# Patient Record
Sex: FEMALE | Race: BLACK OR AFRICAN AMERICAN | Employment: OTHER | URBAN - METROPOLITAN AREA
[De-identification: names, ages, dates, MRNs, and addresses within clinical notes are randomized per-mention and may not be internally consistent; named-entity substitution may affect disease eponyms.]

---

## 2017-07-18 ENCOUNTER — HOSPITAL ENCOUNTER (OUTPATIENT)
Dept: MAMMOGRAPHY | Age: 62
Discharge: HOME | End: 2017-07-18
Attending: FAMILY MEDICINE
Payer: COMMERCIAL

## 2017-07-18 DIAGNOSIS — Z12.31 VISIT FOR SCREENING MAMMOGRAM: ICD-10-CM

## 2017-07-18 PROCEDURE — 77067 SCR MAMMO BI INCL CAD: CPT

## 2018-02-26 ENCOUNTER — HOSPITAL ENCOUNTER (OUTPATIENT)
Age: 63
Discharge: HOME | End: 2018-02-26
Attending: FAMILY MEDICINE
Payer: COMMERCIAL

## 2018-02-26 DIAGNOSIS — M25.552 PAIN IN LEFT HIP: ICD-10-CM

## 2018-02-26 LAB — CREAT UR-MCNC: 0.8 MG/DL (ref 0.6–1.3)

## 2018-02-26 PROCEDURE — 74011250636 HC RX REV CODE- 250/636: Performed by: FAMILY MEDICINE

## 2018-02-26 PROCEDURE — 82565 ASSAY OF CREATININE: CPT

## 2018-02-26 PROCEDURE — A9585 GADOBUTROL INJECTION: HCPCS | Performed by: FAMILY MEDICINE

## 2018-02-26 PROCEDURE — 73723 MRI JOINT LWR EXTR W/O&W/DYE: CPT

## 2018-02-26 RX ADMIN — GADOBUTROL 7.5 ML: 604.72 INJECTION INTRAVENOUS at 12:00

## 2018-06-25 ENCOUNTER — HOSPITAL ENCOUNTER (OUTPATIENT)
Dept: PHYSICAL THERAPY | Age: 63
Discharge: HOME | End: 2018-06-25
Payer: COMMERCIAL

## 2018-06-25 PROCEDURE — 97110 THERAPEUTIC EXERCISES: CPT

## 2018-06-25 PROCEDURE — 97162 PT EVAL MOD COMPLEX 30 MIN: CPT

## 2018-06-25 NOTE — PROGRESS NOTES
[facility], [address] [place] [address]  [phone] fax  Plan of Care/ Statement of Necessity for Physical Therapy Services     Patient name: Corey Gomez Start of Care: 2018   Referral source: JUDAH Caraballo : 1955    Medical Diagnosis: Low back pain [M54.5]  Lumbar radiculopathy [M54.16]   Onset Date:3 months ago    Treatment Diagnosis: LBP/Left hip pain   Prior Hospitalization: see medical history Provider#: [id number]   Medications: Verified on Patient summary List    Comorbidities: arthritis, HTN, history of stroke   Prior Level of Function: Functionally independent. Lives alone in one story home with steps to enter. The Plan of Care and following information is based on the information from the initial evaluation. Assessment/ key information: Patient is a pleasant 57year old female presenting with Left LE pain progressing over the last three months without a known mechanism of injury. Pain has been primarily in the Left hip with intermittent pain down the Left LE with standing/walking, but the worst pain is in the morning. Patient reports the pain limits her from daily activities until she takes tylenol, and she has begun using a SPC. MRI of hip and xray of back showed pressure on a nerve in her back. At evaluation, demonstrated grossly full lumbar AROM with pain/\"kink\" feeling in Left hip with flexion/extension, but tingling/nerve pain with Left S/B, and Left rotation. Additionally, LE strength was 5/5 bilaterally throughout except Left knee extension was 4+/5. Patient reported tingling with Right hip extension and abduction. Over all patient is a good candidate for therapy due to her high PLOF and will benefit from skilled therapy to address the above deficits.       Evaluation Complexity History MEDIUM  Complexity : 1-2 comorbidities / personal factors will impact the outcome/ POC ; Examination LOW Complexity : 1-2 Standardized tests and measures addressing body structure, function, activity limitation and / or participation in recreation  ;Presentation LOW Complexity : Stable, uncomplicated  ;Clinical Decision Making MEDIUM Complexity : FOTO score of 26-74  Overall Complexity Rating: MEDIUM  Problem List: pain affecting function, decrease ROM, decrease strength, edema affecting function, impaired gait/ balance, decrease ADL/ functional abilitiies, decrease activity tolerance, decrease flexibility/ joint mobility and decrease transfer abilities   Treatment Plan may include any combination of the following: Therapeutic exercise, Therapeutic activities, Neuromuscular re-education, Physical agent/modality, Gait/balance training, Manual therapy, Aquatic therapy, Patient education, Self Care training, Functional mobility training, Home safety training and Stair training and lumbar mechanical traction PRN  Patient / Family readiness to learn indicated by: asking questions, trying to perform skills and interest  Persons(s) to be included in education: patient (P)  Barriers to Learning/Limitations: None  Patient Goal (s): no pain if possible  Patient Self Reported Health Status: good  Rehabilitation Potential: good    Short Term Goals: To be accomplished in 1 weeks:  Goal: Patient will initiate and be compliant with HEP in order to progress toward long term goals  Status at last note/certification: issued and reviewed [facility]  Long Term Goals:  To be accomplished in 10 treatments:  Goal: Patient will improve FOTO assessment score to 67 in order to demonstrate improved functional ability  Status at last note/certification: 54  Goal: Patient will demonstrate grossly full and pain free lumbar ROM in order to improve ease of household tasks  Status at last note/certification: full but painful in flexion, extension, Left S/B, and Left rotation  Goal: Patient will report worst pain in Left hip/LE <6/10 in order to progress toward personal goals  Status at last note/certification: 96/83  Goal: Patient will report a 65% improvement in overall function in order to begin returning to recreational activities. Status at last note/certification: n/a  Frequency / Duration: Patient to be seen  times per week for 10 treatments. Patient/ Caregiver education and instruction: Diagnosis, prognosis, activity modification and exercises   [x]  Plan of care has been reviewed with PTA  Nadine Mustafa, SPT  Johnny Herman, PT 6/25/2018 10:58 AM  _____________________________________________________________________  I certify that the above Therapy Services are being furnished while the patient is under my care. I agree with the treatment plan and certify that this therapy is necessary.     Physician's Signature:____________________  Date:__________Time:______    Please sign and return to [facility] HERO HANEY [address]  [phone] fax

## 2018-06-25 NOTE — PROGRESS NOTES
PT DAILY TREATMENT NOTE     Patient Name: Lauryn Quinones  Date:2018  : 1955  [x]  Patient  Verified  Payor: BLUE CROSS / Plan: [employer] [facility] [place] / Product Type: PPO /    In time:1005  Out time:1055  Total Treatment Time (min): 50  (Total 1:1 Time:50)  Visit #: 1 of 10    Treatment Area: Low back pain [M54.5]  Lumbar radiculopathy [M54.16]    SUBJECTIVE  Pain Level (0-10 scale): 2  Any medication changes, allergies to medications, adverse drug reactions, diagnosis change, or new procedure performed?: [x] No    [] Yes (see summary sheet for update)  Subjective functional status/changes:   [] No changes reported      OBJECTIVE    Modality rationale:    Min Type Additional Details    [] Estim:  []Unatt       []IFC  []Premod                        []Other:  []w/ice   []w/heat  Position:  Location:    [] Estim: []Att    []TENS instruct  []NMES                    []Other:  []w/US   []w/ice   []w/heat  Position:  Location:    []  Traction: [] Cervical       []Lumbar                       [] Prone          []Supine                       []Intermittent   []Continuous Lbs:  [] before manual  [] after manual    []  Ultrasound: []Continuous   [] Pulsed                           []1MHz   []3MHz W/cm2:  Location:    []  Iontophoresis with dexamethasone         Location: [] Take home patch   [] In clinic    []  Ice     []  heat  []  Ice massage  []  Laser   []  Anodyne Position:  Location:    []  Laser with stim  []  Other:  Position:  Location:    []  Vasopneumatic Device Pressure:       [] lo [] med [] hi   Temperature: [] lo [] med [] hi   [] Skin assessment post-treatment:  []intact []redness- no adverse reaction    []redness  adverse reaction:     42 min [x]Eval                  []Re-Eval       8 min Therapeutic Exercise:  [] See flow sheet : HEP   Rationale: increase ROM and increase strength to improve the patients ability to complete ADL.         With   [] TE   [] TA   [] neuro   [] other: Patient Education: [x] Review HEP    [] Progressed/Changed HEP based on:   [] positioning   [] body mechanics   [] transfers   [] heat/ice application    [] other:      Other Objective/Functional Measures:      Pain Level (0-10 scale) post treatment: 2    ASSESSMENT/Changes in Function:     Patient will continue to benefit from skilled PT services to modify and progress therapeutic interventions, address functional mobility deficits, address ROM deficits, address strength deficits, analyze and address soft tissue restrictions, analyze and cue movement patterns, analyze and modify body mechanics/ergonomics, assess and modify postural abnormalities, address imbalance/dizziness and instruct in home and community integration to attain remaining goals. [x]  See Plan of Care  []  See progress note/recertification  []  See Discharge Summary         Progress towards goals / Updated goals:  See POC    PLAN  []  Upgrade activities as tolerated     [x]  Continue plan of care  []  Update interventions per flow sheet       []  Discharge due to:_  []  Other:_      Saud Escobar, PT 6/25/2018  10:54 AM    No future appointments.

## 2018-06-26 ENCOUNTER — HOSPITAL ENCOUNTER (OUTPATIENT)
Dept: PHYSICAL THERAPY | Age: 63
Discharge: HOME | End: 2018-06-26
Payer: COMMERCIAL

## 2018-06-26 PROCEDURE — 97110 THERAPEUTIC EXERCISES: CPT

## 2018-07-02 ENCOUNTER — HOSPITAL ENCOUNTER (OUTPATIENT)
Dept: PHYSICAL THERAPY | Age: 63
Discharge: HOME | End: 2018-07-02
Payer: COMMERCIAL

## 2018-07-02 PROCEDURE — 97110 THERAPEUTIC EXERCISES: CPT

## 2018-07-02 NOTE — PROGRESS NOTES
PT DAILY TREATMENT NOTE     Patient Name: Romi Brochure  Date:2018  : 1955  [x]  Patient  Verified  Payor: BLUE CROSS / Plan:  [facility] [place] / Product Type: PPO /    In time:30  Out time:12;00  Total Treatment Time (min): 30  Visit #: 3 of 10    Treatment Area: Low back pain [M54.5]  Left hip pain [M25.552]    SUBJECTIVE  Pain Level (0-10 scale):  0  Any medication changes, allergies to medications, adverse drug reactions, diagnosis change, or new procedure performed?: [x] No    [] Yes (see summary sheet for update)  Subjective functional status/changes:   [] No changes reported  There hasn't been any pain the last few days    OBJECTIVE    30 min Therapeutic Exercise:  [] See flow sheet :   Rationale: increase ROM and increase strength to improve the patients ability to increase activity toleance       With   [] TE   [] TA   [] neuro   [] other: Patient Education: [x] Review HEP    [] Progressed/Changed HEP based on:   [] positioning   [] body mechanics   [] transfers   [] heat/ice application    [] other:      Other Objective/Functional Measures:      Pain Level (0-10 scale) post treatment:  0    ASSESSMENT/Changes in Function: Pt demo improved core activation and control today while performing stabilization ex's. Patient will continue to benefit from skilled PT services to modify and progress therapeutic interventions, address functional mobility deficits, address ROM deficits, address strength deficits, analyze and address soft tissue restrictions, analyze and cue movement patterns, analyze and modify body mechanics/ergonomics, assess and modify postural abnormalities, address imbalance/dizziness and instruct in home and community integration to attain remaining goals.      []  See Plan of Care  []  See progress note/recertification  []  See Discharge Summary         Progress towards goals / Updated goals:  Goal: Patient will initiate and be compliant with HEP in order to progress toward long term goals  Status at last note/certification: issued and reviewed HEP  Goal Met  Long Term Goals: To be accomplished in 10 treatments:  Goal: Patient will improve FOTO assessment score to 67 in order to demonstrate improved functional ability  Status at last note/certification: 54  Goal: Patient will demonstrate grossly full and pain free lumbar ROM in order to improve ease of household tasks  Status at last note/certification: full but painful in flexion, extension, Left S/B, and Left rotation  Goal: Patient will report worst pain in Left hip/LE <6/10 in order to progress toward personal goals  Status at last note/certification: 26/12  Goal: Patient will report a 65% improvement in overall function in order to begin returning to recreational activities.    Status at last note/certification: n/a       PLAN  [x]  Upgrade activities as tolerated     []  Continue plan of care  []  Update interventions per flow sheet       []  Discharge due to:_  []  Other:_      Marzetta Sever, PTA 7/2/2018  12:24 PM    Future Appointments  Date Time Provider Linda Dykes   7/3/2018 11:00  [address] [facility]   7/9/2018 11:30 AM Sukhi Black [facility]   7/10/2018 10:00  [address] [facility]   7/16/2018 10:00  [address] [facility]   7/17/2018 10:00 AM [facility]   7/23/2018 11:30 AM Sukhi Black [facility]   7/24/2018 9:30 AM [facility]   7/30/2018 9:30 AM [facility]   7/31/2018 9:30 AM Glenny Alcala [facility]

## 2018-07-03 ENCOUNTER — HOSPITAL ENCOUNTER (OUTPATIENT)
Dept: PHYSICAL THERAPY | Age: 63
Discharge: HOME | End: 2018-07-03
Payer: COMMERCIAL

## 2018-07-03 PROCEDURE — 97110 THERAPEUTIC EXERCISES: CPT

## 2018-07-03 NOTE — PROGRESS NOTES
PT DAILY TREATMENT NOTE     Patient Name: Sunny Hawk  Date:7/3/2018  : 1955  [x]  Patient  Verified  Payor: BLUE CROSS / Plan:  [facility] [place] / Product Type: PPO /    In time:11;00  Out time:11;30  Total Treatment Time (min): 30  Visit #: 4 of 10    Treatment Area: Low back pain [M54.5]  Left hip pain [M25.552]    SUBJECTIVE  Pain Level (0-10 scale): 0  Any medication changes, allergies to medications, adverse drug reactions, diagnosis change, or new procedure performed?: [x] No    [] Yes (see summary sheet for update)  Subjective functional status/changes:   [] No changes reported  Things are getting better. The pain is less frequent    OBJECTIVE    30 min Therapeutic Exercise:  [] See flow sheet :   Rationale: increase ROM and increase strength to improve the patients ability to improve ease of daily tasks          With   [] TE   [] TA   [] neuro   [] other: Patient Education: [x] Review HEP    [] Progressed/Changed HEP based on:   [] positioning   [] body mechanics   [] transfers   [] heat/ice application    [] other:      Other Objective/Functional Measures:   Progressed reps     Pain Level (0-10 scale) post treatment:  0    ASSESSMENT/Changes in Function: Symptoms are becoming less  Frequent and allowing pt to perform daily tasks with improved ease    Patient will continue to benefit from skilled PT services to modify and progress therapeutic interventions, address functional mobility deficits, address ROM deficits, address strength deficits, analyze and address soft tissue restrictions, analyze and cue movement patterns, analyze and modify body mechanics/ergonomics, assess and modify postural abnormalities, address imbalance/dizziness and instruct in home and community integration to attain remaining goals.      []  See Plan of Care  []  See progress note/recertification  []  See Discharge Summary         Progress towards goals / Updated goals:  Goal: Patient will initiate and be compliant with HEP in order to progress toward long term goals  Status at last note/certification: issued and reviewed HEP  Goal Met  Long Term Goals: To be accomplished in 10 treatments:  Goal: Patient will improve FOTO assessment score to 67 in order to demonstrate improved functional ability  Status at last note/certification: 54   ASSESS NEXT SESSION  Goal: Patient will demonstrate grossly full and pain free lumbar ROM in order to improve ease of household tasks  Status at last note/certification: full but painful in flexion, extension, Left S/B, and Left rotation  Goal: Patient will report worst pain in Left hip/LE <6/10 in order to progress toward personal goals  Status at last note/certification: 80/78  Goal: Patient will report a 65% improvement in overall function in order to begin returning to recreational activities.    Status at last note/certification: n/a  Unable to rate, but symptoms are less frequent    PLAN  [x]  Upgrade activities as tolerated     []  Continue plan of care  []  Update interventions per flow sheet       []  Discharge due to:_  []  Other:_      Mulu Aguero, LYLE 7/3/2018  11:37 AM    Future Appointments  Date Time Provider Linda Dykes   7/9/2018 11:30 AM Keyon Osman PT [facility]   7/10/2018 10:00  [address] [facility]   7/16/2018 10:00  [address] [facility]   7/17/2018 10:00  [address] [facility]   7/23/2018 11:30 AM Keyon Osman PT [facility]   7/24/2018 9:30 AM Shari Carrion [facility]   7/30/2018 9:30 AM Shari Carrion [facility]   7/31/2018 9:30  [address] [facility]

## 2018-07-09 ENCOUNTER — HOSPITAL ENCOUNTER (OUTPATIENT)
Dept: PHYSICAL THERAPY | Age: 63
Discharge: HOME | End: 2018-07-09
Payer: COMMERCIAL

## 2018-07-09 PROCEDURE — 97112 NEUROMUSCULAR REEDUCATION: CPT

## 2018-07-09 PROCEDURE — 97110 THERAPEUTIC EXERCISES: CPT

## 2018-07-09 NOTE — PROGRESS NOTES
PT DAILY TREATMENT NOTE     Patient Name: Marlon Parsons  Date:2018  : 1955  [x]  Patient  Verified  Payor: BLUE CROSS / Plan: [employer] [facility] / Product Type: PPO /    In time:1130  Out time:1212  Total Treatment Time (min): 42  (Total 1:1 Time: 30)  Visit #: 5 of 10    Treatment Area: Low back pain [M54.5]  Left hip pain [M25.552]    SUBJECTIVE  Pain Level (0-10 scale): 0  Any medication changes, allergies to medications, adverse drug reactions, diagnosis change, or new procedure performed?: [x] No    [] Yes (see summary sheet for update)  Subjective functional status/changes:   [] No changes reported  I'm not using my cane! I got a cortisone shot last Thursday and have been feeling really good.      OBJECTIVE    Modality rationale:    Min Type Additional Details    [] Estim:  []Unatt       []IFC  []Premod                        []Other:  []w/ice   []w/heat  Position:  Location:    [] Estim: []Att    []TENS instruct  []NMES                    []Other:  []w/US   []w/ice   []w/heat  Position:  Location:    []  Traction: [] Cervical       []Lumbar                       [] Prone          []Supine                       []Intermittent   []Continuous Lbs:  [] before manual  [] after manual    []  Ultrasound: []Continuous   [] Pulsed                           []1MHz   []3MHz W/cm2:  Location:    []  Iontophoresis with dexamethasone         Location: [] Take home patch   [] In clinic    []  Ice     []  heat  []  Ice massage  []  Laser   []  Anodyne Position:  Location:    []  Laser with stim  []  Other:  Position:  Location:    []  Vasopneumatic Device Pressure:       [] lo [] med [] hi   Temperature: [] lo [] med [] hi   [] Skin assessment post-treatment:  []intact []redness- no adverse reaction    []redness  adverse reaction:     34 min Therapeutic Exercise:  [x] See flow sheet :   Rationale: increase ROM and increase strength to improve the patients ability to improve ease of daily tasks    8 min Neuromuscular Re-education:  [x]  See flow sheet :   Rationale: improve coordination, improve balance and increase proprioception  to improve the patients ability to improve core/gluteal activation          With   [] TE   [] TA   [] neuro   [] other: Patient Education: [x] Review HEP    [] Progressed/Changed HEP based on:   [] positioning   [] body mechanics   [] transfers   [] heat/ice application    [] other:      Other Objective/Functional Measures: Added reformer LE series     Pain Level (0-10 scale) post treatment: 0    ASSESSMENT/Changes in Function: Patient reported no pain with increased resistance and the addition of reformer exercise. Reports that she is no longer using cane to ambulate, and says she feels steady but is slower and more conscious of posture when walking. Patient will continue to benefit from skilled PT services to modify and progress therapeutic interventions, address functional mobility deficits, address ROM deficits, address strength deficits, analyze and address soft tissue restrictions, analyze and cue movement patterns, analyze and modify body mechanics/ergonomics, assess and modify postural abnormalities, address imbalance/dizziness and instruct in home and community integration to attain remaining goals.      []  See Plan of Care  []  See progress note/recertification  []  See Discharge Summary         Progress towards goals / Updated goals:  Goal: Patient will initiate and be compliant with HEP in order to progress toward long term goals  Status at last note/certification: issued and reviewed HEP  Current status: Goal Met  Long Term Goals: To be accomplished in 10 treatments:  Goal: Patient will improve FOTO assessment score to 67 in order to demonstrate improved functional ability  Status at last note/certification: 54   Current status: Progressing-57  Goal: Patient will demonstrate grossly full and pain free lumbar ROM in order to improve ease of household tasks  Status at last note/certification: full but painful in flexion, extension, Left S/B, and Left rotation  Current status: Assess next visit  Goal: Patient will report worst pain in Left hip/LE <6/10 in order to progress toward personal goals  Status at last note/certification: 21/13  Current status: Assess next visit  Goal: Patient will report a 65% improvement in overall function in order to begin returning to recreational activities.    Status at last note/certification: n/a  Current status: Unable to rate, but symptoms are less frequent    PLAN  [x]  Upgrade activities as tolerated     [x]  Continue plan of care  []  Update interventions per flow sheet       []  Discharge due to:_  []  Other:_      Jeffery Dent, SPT  Keyon Osman PT 7/9/2018  11:30 AM    Future Appointments  Date Time Provider Linda Dykes   7/10/2018 10:00  [address] [facility]   7/16/2018 10:00  [address] [facility]   7/17/2018 10:00  [address] [facility]   7/23/2018 11:30 AM Keyon Osman PT [facility]   7/24/2018 9:30 AM Domi Lance [facility]   7/30/2018 9:30  [address] [facility]   7/31/2018 9:30  [address] [facility]

## 2018-07-10 ENCOUNTER — HOSPITAL ENCOUNTER (OUTPATIENT)
Dept: PHYSICAL THERAPY | Age: 63
Discharge: HOME | End: 2018-07-10
Payer: COMMERCIAL

## 2018-07-10 PROCEDURE — 97110 THERAPEUTIC EXERCISES: CPT

## 2018-07-10 PROCEDURE — 97112 NEUROMUSCULAR REEDUCATION: CPT

## 2018-07-10 NOTE — PROGRESS NOTES
PT DAILY TREATMENT NOTE     Patient Name: Norberto Cheadle  Date:7/10/2018  : 1955  [x]  Patient  Verified  Payor: BLUE CROSS / Plan: [employer] [facility] / Product Type: PPO /    In time:9;55  Out time:10;25  Total Treatment Time (min): 30  Visit #: 6 of 10    Treatment Area: Low back pain [M54.5]  Left hip pain [M25.552]    SUBJECTIVE  Pain Level (0-10 scale): 0  Any medication changes, allergies to medications, adverse drug reactions, diagnosis change, or new procedure performed?: [x] No    [] Yes (see summary sheet for update)  Subjective functional status/changes:   [] No changes reported  Just a little pain this morning, but I worked it put.  Now I feel gret    OBJECTIVE    22 min Therapeutic Exercise:  [] See flow sheet :   Rationale: increase ROM and increase strength to improve the patients ability to improve ease of daily tasks     8 min Neuromuscular Re-education:  []  See flow sheet :   Rationale: increase strength and improve coordination  to improve the patients ability to increase core stability for increased activity tolerance          With   [] TE   [] TA   [] neuro   [] other: Patient Education: [x] Review HEP    [] Progressed/Changed HEP based on:   [] positioning   [] body mechanics   [] transfers   [] heat/ice application    [] other:      Other Objective/Functional Measures:      Pain Level (0-10 scale) post treatment:  0    ASSESSMENT/Changes in Function: Progressing well with significant reduction in pain and ease of mobility    Patient will continue to benefit from skilled PT services to modify and progress therapeutic interventions, address functional mobility deficits, address ROM deficits, address strength deficits, analyze and address soft tissue restrictions, analyze and cue movement patterns, analyze and modify body mechanics/ergonomics, assess and modify postural abnormalities, address imbalance/dizziness and instruct in home and community integration to attain remaining goals. []  See Plan of Care  []  See progress note/recertification  []  See Discharge Summary         Progress towards goals / Updated goals:  Goal: Patient will initiate and be compliant with HEP in order to progress toward long term goals  Status at last note/certification: issued and reviewed HEP  Current status: Goal Met  Long Term Goals: To be accomplished in 10 treatments:  Goal: Patient will improve FOTO assessment score to 67 in order to demonstrate improved functional ability  Status at last note/certification: 54   Current status: Progressing-57  Goal: Patient will demonstrate grossly full and pain free lumbar ROM in order to improve ease of household tasks  Status at last note/certification: full but painful in flexion, extension, Left S/B, and Left rotation  Current status: WNL very slight discomfort only. Goal: Patient will report worst pain in Left hip/LE <6/10 in order to progress toward personal goals  Status at last note/certification: 58/34  Current status: met  3/10  Goal: Patient will report a 65% improvement in overall function in order to begin returning to recreational activities.    Status at last note/certification: n/a  Current status: Unable to rate, but symptoms are less frequent    PLAN  [x]  Upgrade activities as tolerated     []  Continue plan of care  []  Update interventions per flow sheet       []  Discharge due to:_  []  Other:_      Jarett Kendrick, LYLE 7/10/2018  9:54 AM    Future Appointments  Date Time Provider Linda Dykes   7/10/2018 10:00  [address] [facility]   7/16/2018 10:00  [address] [facility]   7/17/2018 10:00  [address] [facility]   7/23/2018 11:30 AM Zehra Villalobos PT [facility]   7/24/2018 9:30 AM Rashard You [facility]   7/30/2018 9:30  [address] [facility]   7/31/2018 9:30  [address] [facility]

## 2018-07-16 ENCOUNTER — HOSPITAL ENCOUNTER (OUTPATIENT)
Dept: PHYSICAL THERAPY | Age: 63
Discharge: HOME | End: 2018-07-16
Payer: COMMERCIAL

## 2018-07-16 PROCEDURE — 97110 THERAPEUTIC EXERCISES: CPT

## 2018-07-16 PROCEDURE — 97112 NEUROMUSCULAR REEDUCATION: CPT

## 2018-07-16 NOTE — PROGRESS NOTES
PT DAILY TREATMENT NOTE - [facility]     Patient Name: Ulysses Son  Date:2018  : 1955  [x]  Patient  Verified  Payor: BLUE CROSS / Plan: [employer] [facility] / Product Type: PPO /    In time:10;00  Out time:10;35  Total Treatment Time (min): 35  (1:1 time)  25 minutes  Visit #: 7 of 10    Treatment Area: Low back pain [M54.5]  Left hip pain [M25.552]    SUBJECTIVE  Pain Level (0-10 scale): 0  Any medication changes, allergies to medications, adverse drug reactions, diagnosis change, or new procedure performed?: [x] No    [] Yes (see summary sheet for update)  Subjective functional status/changes:   [] No changes reported  I had an episode of sharp hip pain this morning when I stood up. It was brief, but then I stepped and it started again. I had to sit down and it resolved. OBJECTIVE    25 min Therapeutic Exercise:  [] See flow sheet :   Rationale: increase ROM and increase strength to improve the patients ability to improve ease of mobility, transfers     10 min Neuromuscular Re-education:  []  See flow sheet :   Rationale: increase strength and improve coordination  to improve the patients ability to engage core for lumbar support,ease of walking       With   [] TE   [] TA   [] neuro   [] other: Patient Education: [x] Review HEP    [] Progressed/Changed HEP based on:   [] positioning   [] body mechanics   [] transfers   [] heat/ice application    [] other:      Other Objective/Functional Measures:      Pain Level (0-10 scale) post treatment:  0    ASSESSMENT/Changes in Function: progressing well.   Still has occasional episode of sharp hip pains but brief in duration    Patient will continue to benefit from skilled PT services to modify and progress therapeutic interventions, address functional mobility deficits, address ROM deficits, address strength deficits, analyze and address soft tissue restrictions, analyze and cue movement patterns, analyze and modify body mechanics/ergonomics, assess and modify postural abnormalities, address imbalance/dizziness and instruct in home and community integration to attain remaining goals. []  See Plan of Care  []  See progress note/recertification  []  See Discharge Summary         Progress towards goals / Updated goals:  Goal: Patient will initiate and be compliant with HEP in order to progress toward long term goals  Status at last note/certification: issued and reviewed HEP  Current status: Goal Met  Long Term Goals: To be accomplished in 10 treatments:  Goal: Patient will improve FOTO assessment score to 67 in order to demonstrate improved functional ability  Status at last note/certification: 54   Current status: Progressing-57  Goal: Patient will demonstrate grossly full and pain free lumbar ROM in order to improve ease of household tasks  Status at last note/certification: full but painful in flexion, extension, Left S/B, and Left rotation  Current status: WNL very slight discomfort only. Goal: Patient will report worst pain in Left hip/LE <6/10 in order to progress toward personal goals  Status at last note/certification: 61/33  Current status: met  3/10  Goal: Patient will report a 65% improvement in overall function in order to begin returning to recreational activities.    Status at last note/certification: n/a  Current status: Unable to rate, but symptoms are less frequent    PLAN  [x]  Upgrade activities as tolerated     []  Continue plan of care  []  Update interventions per flow sheet       []  Discharge due to:_  []  Other:_      Checo Mistry PTA 7/16/2018  10:17 AM    Future Appointments  Date Time Provider Linda Dykes   7/17/2018 3:30 PM Bishop Olivo PTA [facility]   7/23/2018 11:30 AM Jayce Talamantes PT [facility]   7/24/2018 9:30 AM Maribel Cabral [facility]   7/30/2018 9:30  [address] [facility]   7/31/2018 9:30  [address] [facility]

## 2018-07-17 ENCOUNTER — HOSPITAL ENCOUNTER (OUTPATIENT)
Dept: PHYSICAL THERAPY | Age: 63
Discharge: HOME | End: 2018-07-17
Payer: COMMERCIAL

## 2018-07-17 PROCEDURE — 97110 THERAPEUTIC EXERCISES: CPT

## 2018-07-17 PROCEDURE — 97112 NEUROMUSCULAR REEDUCATION: CPT

## 2018-07-17 NOTE — PROGRESS NOTES
PT DAILY TREATMENT NOTE     Patient Name: Damon Chou  Date:2018  : 1955  [x]  Patient  Verified  Payor: BLUE CROSS / Plan:  [facility] / Product Type: PPO /    In time:3:30  Out time:4:15  Total Treatment Time (min): 45  1;1: 35 mins  Visit #: 8 of 10    Treatment Area: Low back pain [M54.5]  Left hip pain [M25.552]    SUBJECTIVE  Pain Level (0-10 scale): 0/10  Any medication changes, allergies to medications, adverse drug reactions, diagnosis change, or new procedure performed?: [x] No    [] Yes (see summary sheet for update)  Subjective functional status/changes:   [] No changes reported  \"No p! \"    OBJECTIVE    30 min Therapeutic Exercise:  [] See flow sheet :   Rationale: increase ROM and increase strength to improve the patients ability to perform ADLs with less difficulty. 15 min Neuromuscular Re-education:  []  See flow sheet :pilates, core stab. Ex. Rationale: increase ROM and increase strength  to improve the patients ability to perform ADLs with less difficulty. With   [] TE   [] TA   [] neuro   [] other: Patient Education: [x] Review HEP    [] Progressed/Changed HEP based on:   [] positioning   [] body mechanics   [] transfers   [] heat/ice application    [] other:      Other Objective/Functional Measures:      Pain Level (0-10 scale) post treatment: 0/10    ASSESSMENT/Changes in Function: Continued with ex. Per flow sheet. No p! Was reported during or after therapy. Patient will continue to benefit from skilled PT services to modify and progress therapeutic interventions, address functional mobility deficits, address ROM deficits, address strength deficits, analyze and address soft tissue restrictions and analyze and cue movement patterns to attain remaining goals.      []  See Plan of Care  []  See progress note/recertification  []  See Discharge Summary         Progress towards goals / Updated goals:  Goal: Patient will initiate and be compliant with HEP in order to progress toward long term goals  Status at last note/certification: issued and reviewed HEP  Current status: Goal Met  Long Term Goals: To be accomplished in 10 treatments:  Goal: Patient will improve FOTO assessment score to 67 in order to demonstrate improved functional ability  Status at last note/certification: 54   Current status: Progressing-57  Goal: Patient will demonstrate grossly full and pain free lumbar ROM in order to improve ease of household tasks  Status at last note/certification: full but painful in flexion, extension, Left S/B, and Left rotation  Current status: WNL very slight discomfort only. Goal: Patient will report worst pain in Left hip/LE <6/10 in order to progress toward personal goals  Status at last note/certification: 27/00  Current status: met  3/10  Goal: Patient will report a 65% improvement in overall function in order to begin returning to recreational activities.    Status at last note/certification: n/a  Current status: Unable to rate, but symptoms are less frequent       PLAN  []  Upgrade activities as tolerated     [x]  Continue plan of care  []  Update interventions per flow sheet       []  Discharge due to:_  []  Other:_      Ruiz Sanders PTA 7/17/2018  3:33 PM    Future Appointments  Date Time Provider Linda Dykes   7/23/2018 11:30 AM Misa Harrison PT [facility]   7/23/2018 2:00 PM [facility]   7/24/2018 9:30 AM Fany Brewster [facility]   7/30/2018 9:30  [address] [facility]   7/31/2018 9:30 AM Glenny Pina 32 [facility]

## 2018-07-23 ENCOUNTER — HOSPITAL ENCOUNTER (OUTPATIENT)
Dept: MAMMOGRAPHY | Age: 63
Discharge: HOME | End: 2018-07-23
Attending: FAMILY MEDICINE
Payer: COMMERCIAL

## 2018-07-23 ENCOUNTER — HOSPITAL ENCOUNTER (OUTPATIENT)
Dept: PHYSICAL THERAPY | Age: 63
Discharge: HOME | End: 2018-07-23
Payer: COMMERCIAL

## 2018-07-23 DIAGNOSIS — Z12.31 VISIT FOR SCREENING MAMMOGRAM: ICD-10-CM

## 2018-07-23 PROCEDURE — 97112 NEUROMUSCULAR REEDUCATION: CPT

## 2018-07-23 PROCEDURE — 97110 THERAPEUTIC EXERCISES: CPT

## 2018-07-23 PROCEDURE — 77067 SCR MAMMO BI INCL CAD: CPT

## 2018-07-23 NOTE — PROGRESS NOTES
PT DISCHARGE DAILY NOTE AND [id number]    Date:2018  Patient name: Loco Brenner Start of Care: 2018   Referral source: JUDAH Eckert : 1955                          Medical Diagnosis: Low back pain [M54.5]  Lumbar radiculopathy [M54.16] Onset Date:3 months ago                          Treatment Diagnosis: LBP/Left hip pain   Prior Hospitalization: see medical history Provider#: [id number]   Medications: Verified on Patient summary List    Comorbidities: arthritis, HTN, history of stroke   Prior Level of Function: Functionally independent. Lives alone in one story home with steps to enter. Visits from Start of Care: 9    Missed Visits: 0    Reporting Period : 18 to 18    [x]  Patient  Verified  Payor: BLUE CROSS / Plan: [employer] [facility] / Product Type: PPO /    In time:1125  Out time:1210  Total Treatment Time (min): 45  Total 1:1 Time:35  Visit #: 9 of 10    SUBJECTIVE  Pain Level (0-10 scale): 0  Any medication changes, allergies to medications, adverse drug reactions, diagnosis change, or new procedure performed?: [x] No    [] Yes (see summary sheet for update)  Subjective functional status/changes:   [] No changes reported  I feel good. Every once in awhile I turn and I get a pain but it is only 1/10.     OBJECTIVE    Modality rationale:    Min Type Additional Details    [] Estim:  []Unatt       []IFC  []Premod                        []Other:  []w/ice   []w/heat  Position:  Location:    [] Estim: []Att    []TENS instruct  []NMES                    []Other:  []w/US   []w/ice   []w/heat  Position:  Location:    []  Traction: [] Cervical       []Lumbar                       [] Prone          []Supine                       []Intermittent   []Continuous Lbs:  [] before manual  [] after manual    []  Ultrasound: []Continuous   [] Pulsed                           []1MHz   []3MHz W/cm2:  Location:    []  Iontophoresis with dexamethasone         Location: [] Take home patch   [] In clinic    []  Ice     []  heat  []  Ice massage  []  Laser   []  Anodyne Position:  Location:    []  Laser with stim  []  Other:  Position:  Location:    []  Vasopneumatic Device Pressure:       [] lo [] med [] hi   Temperature: [] lo [] med [] hi   [] Skin assessment post-treatment:  []intact []redness- no adverse reaction    []redness  adverse reaction:     30 min Therapeutic Exercise:  [x] See flow sheet :   Rationale: increase ROM and increase strength to improve the patients ability to to improve ease of mobility, daily tasks    15 min Neuromuscular Re-education:  [x]  See flow sheet :   Rationale: improve coordination and increase proprioception  to improve the patients ability to improve core activation for lumbar support    With   [] TE   [] TA   [] neuro   [] other: Patient Education: [x] Review HEP    [] Progressed/Changed HEP based on:   [] positioning   [] body mechanics   [] transfers   [] heat/ice application    [] other:      Other Objective/Functional Measures: Updated HEP, issued green T-band     Pain Level (0-10 scale) post treatment: 0    Summary of Care:  Goal: Patient will initiate and be compliant with HEP in order to progress toward long term goals  Status at last note/certification: issued and reviewed HEP  Current status: Goal Met  Long Term Goals: To be accomplished in 10 treatments:  Goal: Patient will improve FOTO assessment score to 67 in order to demonstrate improved functional ability  Status at last note/certification: 54   Current status: Met, 82  Goal: Patient will demonstrate grossly full and pain free lumbar ROM in order to improve ease of household tasks  Status at last note/certification: full but painful in flexion, extension, Left S/B, and Left rotation  Current status: Progressing, Grossly full with minor left hip pain noted with Left rotation and Lateral flexion  Goal: Patient will report worst pain in Left hip/LE <6/10 in order to progress toward personal goals  Status at last note/certification: 22/58  Current status: Met,  1/10  Goal: Patient will report a 65% improvement in overall function in order to begin returning to recreational activities. Status at last note/certification: n/a  Current status: Met, 90%    ASSESSMENT/Changes in Function: Patient has consistently attended therapy for LBP/Left hip pain. Patient has noted significant decreases in pain, improved ease of mobility, and no longer ambulates with SPC. Due gains, independence with routine, and patient reported 90% improvement we are discharging the patient to self management at this time.      Thank you for this referral!      PLAN  [x]Discontinue therapy: [x]Patient has reached or is progressing toward set goals      []Patient is non-compliant or has abdicated      []Due to lack of appreciable progress towards set goals    Feliberto Walker, CRYSTAL Bauman, PT 7/23/2018  11:41 AM

## 2018-07-24 ENCOUNTER — HOSPITAL ENCOUNTER (OUTPATIENT)
Dept: PHYSICAL THERAPY | Age: 63
End: 2018-07-24
Payer: COMMERCIAL

## 2018-07-30 ENCOUNTER — APPOINTMENT (OUTPATIENT)
Dept: PHYSICAL THERAPY | Age: 63
End: 2018-07-30
Payer: COMMERCIAL

## 2018-07-31 ENCOUNTER — APPOINTMENT (OUTPATIENT)
Dept: PHYSICAL THERAPY | Age: 63
End: 2018-07-31
Payer: COMMERCIAL

## 2019-07-26 ENCOUNTER — HOSPITAL ENCOUNTER (OUTPATIENT)
Dept: MAMMOGRAPHY | Age: 64
Discharge: HOME | End: 2019-07-26
Attending: FAMILY MEDICINE
Payer: MEDICARE

## 2019-07-26 DIAGNOSIS — Z12.31 VISIT FOR SCREENING MAMMOGRAM: ICD-10-CM

## 2019-07-26 PROCEDURE — 77063 BREAST TOMOSYNTHESIS BI: CPT

## 2019-10-14 ENCOUNTER — HOSPITAL ENCOUNTER (OUTPATIENT)
Dept: LAB | Age: 64
Discharge: HOME | End: 2019-10-14
Payer: MEDICARE

## 2019-10-14 DIAGNOSIS — I10 ESSENTIAL HYPERTENSION, MALIGNANT: ICD-10-CM

## 2019-10-14 LAB
ATRIAL RATE: 77 BPM
CALCULATED P AXIS, ECG09: 58 DEGREES
CALCULATED R AXIS, ECG10: 21 DEGREES
CALCULATED T AXIS, ECG11: 102 DEGREES
DIAGNOSIS, 93000: NORMAL
P-R INTERVAL, ECG05: 162 MS
Q-T INTERVAL, ECG07: 392 MS
QRS DURATION, ECG06: 86 MS
QTC CALCULATION (BEZET), ECG08: 443 MS
VENTRICULAR RATE, ECG03: 77 BPM

## 2019-10-14 PROCEDURE — 93005 ELECTROCARDIOGRAM TRACING: CPT
